# Patient Record
Sex: FEMALE | Race: WHITE | Employment: FULL TIME | URBAN - METROPOLITAN AREA
[De-identification: names, ages, dates, MRNs, and addresses within clinical notes are randomized per-mention and may not be internally consistent; named-entity substitution may affect disease eponyms.]

---

## 2017-05-01 LAB
HIGH RISK HPV CERVIX: NORMAL
PAP SMEAR: NORMAL

## 2018-07-19 ENCOUNTER — OFFICE VISIT (OUTPATIENT)
Dept: INTERNAL MEDICINE CLINIC | Age: 48
End: 2018-07-19

## 2018-07-19 VITALS
DIASTOLIC BLOOD PRESSURE: 80 MMHG | HEIGHT: 63 IN | HEART RATE: 64 BPM | BODY MASS INDEX: 23.21 KG/M2 | WEIGHT: 131 LBS | SYSTOLIC BLOOD PRESSURE: 118 MMHG

## 2018-07-19 DIAGNOSIS — Z00.00 ANNUAL PHYSICAL EXAM: Primary | ICD-10-CM

## 2018-07-19 DIAGNOSIS — M81.0 OSTEOPOROSIS WITHOUT CURRENT PATHOLOGICAL FRACTURE, UNSPECIFIED OSTEOPOROSIS TYPE: ICD-10-CM

## 2018-07-19 DIAGNOSIS — E89.40 PREMATURE SURGICAL MENOPAUSE: ICD-10-CM

## 2018-07-19 DIAGNOSIS — R79.89 HIGH SERUM CREATINE: ICD-10-CM

## 2018-07-19 LAB
A/G RATIO: 1.8 (ref 1.1–2.2)
ALBUMIN SERPL-MCNC: 4.7 G/DL (ref 3.4–5)
ALP BLD-CCNC: 55 U/L (ref 40–129)
ALT SERPL-CCNC: 22 U/L (ref 10–40)
ANION GAP SERPL CALCULATED.3IONS-SCNC: 12 MMOL/L (ref 3–16)
AST SERPL-CCNC: 24 U/L (ref 15–37)
BASOPHILS ABSOLUTE: 0 K/UL (ref 0–0.2)
BASOPHILS RELATIVE PERCENT: 0.6 %
BILIRUB SERPL-MCNC: 0.5 MG/DL (ref 0–1)
BUN BLDV-MCNC: 23 MG/DL (ref 7–20)
CALCIUM SERPL-MCNC: 10 MG/DL (ref 8.3–10.6)
CHLORIDE BLD-SCNC: 103 MMOL/L (ref 99–110)
CHOLESTEROL, TOTAL: 195 MG/DL (ref 0–199)
CO2: 23 MMOL/L (ref 21–32)
CREAT SERPL-MCNC: 1.7 MG/DL (ref 0.6–1.1)
EOSINOPHILS ABSOLUTE: 0 K/UL (ref 0–0.6)
EOSINOPHILS RELATIVE PERCENT: 0.8 %
GFR AFRICAN AMERICAN: 39
GFR NON-AFRICAN AMERICAN: 32
GLOBULIN: 2.6 G/DL
GLUCOSE BLD-MCNC: 81 MG/DL (ref 70–99)
HCT VFR BLD CALC: 40.5 % (ref 36–48)
HDLC SERPL-MCNC: 60 MG/DL (ref 40–60)
HEMOGLOBIN: 13.4 G/DL (ref 12–16)
LDL CHOLESTEROL CALCULATED: 120 MG/DL
LYMPHOCYTES ABSOLUTE: 1.1 K/UL (ref 1–5.1)
LYMPHOCYTES RELATIVE PERCENT: 22.2 %
MCH RBC QN AUTO: 31.2 PG (ref 26–34)
MCHC RBC AUTO-ENTMCNC: 33.1 G/DL (ref 31–36)
MCV RBC AUTO: 94.2 FL (ref 80–100)
MONOCYTES ABSOLUTE: 0.3 K/UL (ref 0–1.3)
MONOCYTES RELATIVE PERCENT: 6 %
NEUTROPHILS ABSOLUTE: 3.6 K/UL (ref 1.7–7.7)
NEUTROPHILS RELATIVE PERCENT: 70.4 %
PDW BLD-RTO: 13.5 % (ref 12.4–15.4)
PLATELET # BLD: 191 K/UL (ref 135–450)
PMV BLD AUTO: 10.6 FL (ref 5–10.5)
POTASSIUM SERPL-SCNC: 4.6 MMOL/L (ref 3.5–5.1)
RBC # BLD: 4.29 M/UL (ref 4–5.2)
SODIUM BLD-SCNC: 138 MMOL/L (ref 136–145)
TOTAL PROTEIN: 7.3 G/DL (ref 6.4–8.2)
TRIGL SERPL-MCNC: 76 MG/DL (ref 0–150)
TSH REFLEX: 2.03 UIU/ML (ref 0.27–4.2)
VITAMIN D 25-HYDROXY: 63.3 NG/ML
VLDLC SERPL CALC-MCNC: 15 MG/DL
WBC # BLD: 5.1 K/UL (ref 4–11)

## 2018-07-19 PROCEDURE — 99396 PREV VISIT EST AGE 40-64: CPT | Performed by: FAMILY MEDICINE

## 2018-07-19 ASSESSMENT — PATIENT HEALTH QUESTIONNAIRE - PHQ9
1. LITTLE INTEREST OR PLEASURE IN DOING THINGS: 0
SUM OF ALL RESPONSES TO PHQ9 QUESTIONS 1 & 2: 0
SUM OF ALL RESPONSES TO PHQ QUESTIONS 1-9: 0
2. FEELING DOWN, DEPRESSED OR HOPELESS: 0

## 2018-07-19 NOTE — PROGRESS NOTES
Chief Complaint   Patient presents with    Annual Exam     Fasting today       PREVENTATIVE VISIT AND EXAM      Exercise:  30 minutes six days per week. Patient Active Problem List   Diagnosis    Osteoporosis    Premature surgical menopause    High serum creatine       Past Medical History:   Diagnosis Date    Anemia     Blood transfusion     Endometriosis     Osteoporosis     forearm density -3;  all else osteopenia    Recurrent UTI (urinary tract infection)      Past Surgical History:   Procedure Laterality Date    APPENDECTOMY  9/00    COLECTOMY  9/00    small section    SALPINGO-OOPHORECTOMY  7/01, 47/54    BSO- complicated with pneumonia/sepsis    URETER STENT PLACEMENT  4/03    then removed in 10/03     Family History   Problem Relation Age of Onset    Arthritis Father         wheelchair primarily and inactive    High Cholesterol Father     Dementia Father         milder stages born 5    Diabetes Mother     Hypertension Mother     Mental Illness Sister         Bipolar, ADHD   [employer] Sister     Asthma Sister     Other Other         adopted son;  not biologic, born 2007    Other Brother         GI issues, not defined     Social History   Substance Use Topics    Smoking status: Never Smoker    Smokeless tobacco: Never Used    Alcohol use No       Outpatient Prescriptions Marked as Taking for the 7/19/18 encounter (Office Visit) with Luda Kay MD   Medication Sig Dispense Refill    calcium carbonate (OSCAL) 500 MG TABS tablet Take 500 mg by mouth daily.  Estradiol-Norethindrone Acet (ACTIVELLA PO) Take  by mouth daily. ROS:  Full 12 system review done and all negative except for the following:   Frequent migraines  Color changes to fingers and toes periodically. Turn white. Physical Exam   Vitals:    07/19/18 0931   BP: 118/80   Pulse: 64     Body mass index is 23.21 kg/m².   Wt Readings from Last 3 Encounters:   07/19/18 131 lb (59.4 kg)

## 2018-07-21 DIAGNOSIS — R79.89 HIGH SERUM CREATINE: Primary | ICD-10-CM

## 2018-10-30 ENCOUNTER — TELEPHONE (OUTPATIENT)
Dept: INTERNAL MEDICINE CLINIC | Age: 48
End: 2018-10-30

## 2018-10-30 NOTE — TELEPHONE ENCOUNTER
Pt call and states that she fax the paperwork for Dr Kat Webb to sign and to put the office stamp. She states she call to request Dr Kat Webb to sign , she had to have that by tomorrow. She request to fax her back to the fax number she provide in that paperwork. She request call back if you have any question.

## 2019-01-29 PROBLEM — N17.9 AKI (ACUTE KIDNEY INJURY) (HCC): Status: ACTIVE | Noted: 2019-01-29

## 2019-01-29 PROBLEM — E87.6 HYPOKALEMIA: Status: ACTIVE | Noted: 2019-01-29

## 2019-03-11 PROBLEM — Z87.42 HISTORY OF ENDOMETRIOSIS: Status: ACTIVE | Noted: 2018-09-24

## 2019-03-11 PROBLEM — D64.9 ANEMIA: Status: ACTIVE | Noted: 2018-09-24

## 2019-03-11 PROBLEM — N80.9 ENDOMETRIOSIS: Status: ACTIVE | Noted: 2019-03-11

## 2019-05-08 PROBLEM — R80.0 ISOLATED PROTEINURIA: Status: ACTIVE | Noted: 2019-05-08

## 2019-05-08 PROBLEM — I10 ESSENTIAL HYPERTENSION: Status: ACTIVE | Noted: 2019-05-08

## 2019-09-03 ENCOUNTER — TELEPHONE (OUTPATIENT)
Dept: INTERNAL MEDICINE CLINIC | Age: 49
End: 2019-09-03

## 2019-09-03 DIAGNOSIS — Z78.0 POST-MENOPAUSE: Primary | ICD-10-CM

## 2019-10-09 ENCOUNTER — OFFICE VISIT (OUTPATIENT)
Dept: INTERNAL MEDICINE CLINIC | Age: 49
End: 2019-10-09
Payer: COMMERCIAL

## 2019-10-09 VITALS
DIASTOLIC BLOOD PRESSURE: 76 MMHG | SYSTOLIC BLOOD PRESSURE: 110 MMHG | WEIGHT: 135 LBS | HEART RATE: 64 BPM | HEIGHT: 63 IN | BODY MASS INDEX: 23.92 KG/M2

## 2019-10-09 DIAGNOSIS — J30.0 VASOMOTOR RHINITIS: ICD-10-CM

## 2019-10-09 DIAGNOSIS — Z00.00 ANNUAL PHYSICAL EXAM: Primary | ICD-10-CM

## 2019-10-09 DIAGNOSIS — Z23 NEED FOR INFLUENZA VACCINATION: ICD-10-CM

## 2019-10-09 DIAGNOSIS — E89.40 PREMATURE SURGICAL MENOPAUSE: ICD-10-CM

## 2019-10-09 DIAGNOSIS — M85.89 OSTEOPENIA OF MULTIPLE SITES: ICD-10-CM

## 2019-10-09 PROBLEM — E87.6 HYPOKALEMIA: Status: RESOLVED | Noted: 2019-01-29 | Resolved: 2019-10-09

## 2019-10-09 LAB
A/G RATIO: 3 (ref 1.1–2.2)
ALBUMIN SERPL-MCNC: 5.1 G/DL (ref 3.4–5)
ALP BLD-CCNC: 37 U/L (ref 40–129)
ALT SERPL-CCNC: 13 U/L (ref 10–40)
ANION GAP SERPL CALCULATED.3IONS-SCNC: 12 MMOL/L (ref 3–16)
AST SERPL-CCNC: 18 U/L (ref 15–37)
BILIRUB SERPL-MCNC: 0.5 MG/DL (ref 0–1)
BUN BLDV-MCNC: 16 MG/DL (ref 7–20)
CALCIUM SERPL-MCNC: 9.8 MG/DL (ref 8.3–10.6)
CHLORIDE BLD-SCNC: 102 MMOL/L (ref 99–110)
CHOLESTEROL, TOTAL: 161 MG/DL (ref 0–199)
CO2: 23 MMOL/L (ref 21–32)
CREAT SERPL-MCNC: 1.4 MG/DL (ref 0.6–1.1)
GFR AFRICAN AMERICAN: 49
GFR NON-AFRICAN AMERICAN: 40
GLOBULIN: 1.7 G/DL
GLUCOSE BLD-MCNC: 81 MG/DL (ref 70–99)
HDLC SERPL-MCNC: 50 MG/DL (ref 40–60)
LDL CHOLESTEROL CALCULATED: 96 MG/DL
POTASSIUM SERPL-SCNC: 4.8 MMOL/L (ref 3.5–5.1)
SODIUM BLD-SCNC: 137 MMOL/L (ref 136–145)
TOTAL PROTEIN: 6.8 G/DL (ref 6.4–8.2)
TRIGL SERPL-MCNC: 73 MG/DL (ref 0–150)
TSH REFLEX: 2.08 UIU/ML (ref 0.27–4.2)
VLDLC SERPL CALC-MCNC: 15 MG/DL

## 2019-10-09 PROCEDURE — 90471 IMMUNIZATION ADMIN: CPT | Performed by: FAMILY MEDICINE

## 2019-10-09 PROCEDURE — 99396 PREV VISIT EST AGE 40-64: CPT | Performed by: FAMILY MEDICINE

## 2019-10-09 PROCEDURE — 90686 IIV4 VACC NO PRSV 0.5 ML IM: CPT | Performed by: FAMILY MEDICINE

## 2019-10-09 RX ORDER — AZELASTINE 1 MG/ML
SPRAY, METERED NASAL
Qty: 1 BOTTLE | Refills: 12 | Status: SHIPPED | OUTPATIENT
Start: 2019-10-09

## 2019-10-09 RX ORDER — ESTRADIOL 2 MG/1
1 TABLET ORAL DAILY
COMMUNITY
Start: 2018-04-24

## 2019-10-09 SDOH — ECONOMIC STABILITY: TRANSPORTATION INSECURITY
IN THE PAST 12 MONTHS, HAS LACK OF TRANSPORTATION KEPT YOU FROM MEETINGS, WORK, OR FROM GETTING THINGS NEEDED FOR DAILY LIVING?: NO

## 2019-10-09 SDOH — ECONOMIC STABILITY: TRANSPORTATION INSECURITY
IN THE PAST 12 MONTHS, HAS THE LACK OF TRANSPORTATION KEPT YOU FROM MEDICAL APPOINTMENTS OR FROM GETTING MEDICATIONS?: NO

## 2019-10-09 ASSESSMENT — PATIENT HEALTH QUESTIONNAIRE - PHQ9
1. LITTLE INTEREST OR PLEASURE IN DOING THINGS: 0
SUM OF ALL RESPONSES TO PHQ9 QUESTIONS 1 & 2: 0
SUM OF ALL RESPONSES TO PHQ QUESTIONS 1-9: 0
2. FEELING DOWN, DEPRESSED OR HOPELESS: 0
SUM OF ALL RESPONSES TO PHQ QUESTIONS 1-9: 0

## 2019-10-25 ENCOUNTER — TELEPHONE (OUTPATIENT)
Dept: INTERNAL MEDICINE CLINIC | Age: 49
End: 2019-10-25

## 2020-07-31 LAB
HIGH RISK HPV CERVIX: NEGATIVE
PAP SMEAR, EXTERNAL: NORMAL

## 2020-10-19 ENCOUNTER — OFFICE VISIT (OUTPATIENT)
Dept: INTERNAL MEDICINE CLINIC | Age: 50
End: 2020-10-19
Payer: COMMERCIAL

## 2020-10-19 VITALS
SYSTOLIC BLOOD PRESSURE: 130 MMHG | HEART RATE: 56 BPM | BODY MASS INDEX: 25.69 KG/M2 | DIASTOLIC BLOOD PRESSURE: 64 MMHG | WEIGHT: 145 LBS | HEIGHT: 63 IN

## 2020-10-19 PROBLEM — F95.0 TIC DISORDER, TRANSIENT, RECURRENT: Status: ACTIVE | Noted: 2020-10-19

## 2020-10-19 PROCEDURE — 90472 IMMUNIZATION ADMIN EACH ADD: CPT | Performed by: FAMILY MEDICINE

## 2020-10-19 PROCEDURE — 99396 PREV VISIT EST AGE 40-64: CPT | Performed by: FAMILY MEDICINE

## 2020-10-19 PROCEDURE — 90715 TDAP VACCINE 7 YRS/> IM: CPT | Performed by: FAMILY MEDICINE

## 2020-10-19 PROCEDURE — 90686 IIV4 VACC NO PRSV 0.5 ML IM: CPT | Performed by: FAMILY MEDICINE

## 2020-10-19 PROCEDURE — 90471 IMMUNIZATION ADMIN: CPT | Performed by: FAMILY MEDICINE

## 2020-10-19 SDOH — ECONOMIC STABILITY: FOOD INSECURITY: WITHIN THE PAST 12 MONTHS, THE FOOD YOU BOUGHT JUST DIDN'T LAST AND YOU DIDN'T HAVE MONEY TO GET MORE.: NEVER TRUE

## 2020-10-19 SDOH — ECONOMIC STABILITY: INCOME INSECURITY: HOW HARD IS IT FOR YOU TO PAY FOR THE VERY BASICS LIKE FOOD, HOUSING, MEDICAL CARE, AND HEATING?: NOT HARD AT ALL

## 2020-10-19 SDOH — ECONOMIC STABILITY: FOOD INSECURITY: WITHIN THE PAST 12 MONTHS, YOU WORRIED THAT YOUR FOOD WOULD RUN OUT BEFORE YOU GOT MONEY TO BUY MORE.: NEVER TRUE

## 2020-10-19 ASSESSMENT — PATIENT HEALTH QUESTIONNAIRE - PHQ9
SUM OF ALL RESPONSES TO PHQ QUESTIONS 1-9: 0
SUM OF ALL RESPONSES TO PHQ QUESTIONS 1-9: 0
2. FEELING DOWN, DEPRESSED OR HOPELESS: 0
SUM OF ALL RESPONSES TO PHQ QUESTIONS 1-9: 0
SUM OF ALL RESPONSES TO PHQ9 QUESTIONS 1 & 2: 0
1. LITTLE INTEREST OR PLEASURE IN DOING THINGS: 0

## 2020-10-19 NOTE — PATIENT INSTRUCTIONS
Look up sites that end with .gov or .org  These would be reliable in the information given. Be suspicious of dot com sites.

## 2020-10-19 NOTE — PROGRESS NOTES
Chief Complaint   Patient presents with    Annual Exam       PREVENTATIVE VISIT AND EXAM      Working from home. Snacking more and not as active during the work day but she does exercise regularly. Feeling fine. Will see nephrologist again in Dec.  She is concerned about her risk of Lewy Body dementia. Her father  in his early 76s from this. She reports that he retired early and then just stat and watched TV after he retired and did not stay active or busy. Exercise:  45 min 6=7 days a week     Patient Active Problem List   Diagnosis    Osteopenia of multiple sites    Premature surgical menopause    High serum creatine - creat clearance on 24 hr urine much higher than calculated.  FORTINO (acute kidney injury) ([facility] [address]    Anemia    Dermatophytosis    Disorder of kidney and ureter    Endometriosis    Hematuria    History of endometriosis    Isolated proteinuria    Essential hypertension    Vasomotor rhinitis       Past Medical History:   Diagnosis Date    Anemia     Blood transfusion     Chronic kidney disease     Endometriosis     Hypertension     Osteoporosis     forearm density -3;  all else osteopenia    Recurrent UTI (urinary tract infection)      Past Surgical History:   Procedure Laterality Date    APPENDECTOMY      COLECTOMY      small section    SALPINGO-OOPHORECTOMY  ,     BSO- complicated with pneumonia/sepsis    URETER STENT PLACEMENT      then removed in 10/03     Family History   Problem Relation Age of Onset    Arthritis Father         wheelchair primarily and inactive    High Cholesterol Father     Dementia Father         Lewy Body.    age 68    Diabetes Mother     Hypertension Mother     Mental Illness Sister         Bipolar, ADHD   [employer] Migraines Sister     Asthma Sister    Vincent Loser Other Other         adopted son, born ; Asperger's mild    Other Brother         GI issues, possibly Crohns     Social History     Tobacco Use    Smoking status: Never Smoker    Smokeless tobacco: Never Used   Substance Use Topics    Alcohol use: No    Drug use: No       Outpatient Medications Marked as Taking for the 10/19/20 encounter (Office Visit) with Milan Cobb MD   Medication Sig Dispense Refill    norethindrone (AYGESTIN) 5 MG tablet Take 1/2 of one tablet daily po      estradiol (ESTRACE) 2 MG tablet Take 1 mg by mouth daily      azelastine (ASTELIN) 0.1 % nasal spray 1-2 spray in each nostril twice daily as needed. 1 Bottle 12    calcium carbonate (OSCAL) 500 MG TABS tablet Take 500 mg by mouth daily           ROS:  Full 12 system review done and all negative except for the following: All negative except dose not a head tic when she is a little anxious or stressed. Her brother has this also. It is not always present. Physical Exam   Vitals:    10/19/20 1448   BP: 130/64   Pulse: 56      Body mass index is 25.69 kg/m². Wt Readings from Last 3 Encounters:   10/19/20 145 lb (65.8 kg)   06/23/20 142 lb (64.4 kg)   11/13/19 139 lb 3.2 oz (63.1 kg)         Constitutional: Oriented to person, place, and time. Appears well-developed and well-nourished. No distress. HENT:   Head: Normocephalic and atraumatic. Ears: Hearing, tympanic membrane, external ear and ear canal normal.   Nose: Nose normal.   Mouth/Throat: Uvula is midline, oropharynx is clear and moist and mucous membranes are normal.   Eyes: Conjunctivae and EOM are normal. Pupils are equal, round, and reactive to light. No scleral icterus. Neck: Normal range of motion. Neck supple. Normal carotid pulses and no JVD present. Carotid bruit is not present. No tracheal deviation present. No mass and no thyromegaly present. Cardiovascular: Normal rate, regular rhythm, S1 normal, S2 normal, normal heart sounds and intact distal pulses. No murmur heard. Pulmonary/Chest: Effort normal and breath sounds normal. No stridor. No respiratory distress. No decreased breath sounds.  No wheezes. No rhonchi. No rales. Abdominal: Soft. Normal appearance and bowel sounds are normal. No distension, no abdominal bruit and no mass. There is no hepatomegaly. No tenderness. Musculoskeletal: Normal range of motion. No edema. Lymphadenopathy:     No cervical adenopathy. No axillary adenopathy. No supraclavicular adenopathy. No Inguinal adenopathy. Neurological: Alert and oriented to person, place, and time. Normal reflexes. No tremor, but has a tic of her head/neck with a jerking. No cranial nerve deficit. Normal muscle tone. Coordination and gait normal.   Skin: Skin is warm and dry. No rash noted. Not diaphoretic. No cyanosis. No pallor. Nails show no clubbing. Psychiatric:  Normal mood and affect. Speech is normal and behavior is normal. Cognition and memory are normal.       The 10-year ASCVD risk score (Nati Mcnulty et al., 2013) is: 0.9%    Values used to calculate the score:      Age: 52 years      Sex: Female      Is Non- : No      Diabetic: No      Tobacco smoker: No      Systolic Blood Pressure: 740 mmHg      Is BP treated: No      HDL Cholesterol: 50 mg/dL      Total Cholesterol: 161 mg/dL      Assessment:      1. Annual physical exam  Update HM issues. - CBC; Future  - Lipid Panel; Future  - TSH with Reflex; Future  - Renal Function Panel; Future  - PTH, Intact; Future    2. Need for Tdap vaccination  Discussed. - Tdap (age 6y and older) IM ([address])    3. Disorder of kidney and ureter  Monitor renal function. Has proteinuria and probably has stage 2 CKD. - Cystatin C; Future  - PTH, Intact; Future    4. Persistent proteinuria  Monitor. She will get her urine with nephrologist.   - Cystatin C; Future  - PTH, Intact; Future    5. Tic disorder, transient, recurrent  Will watch. 6. Need for influenza vaccination  - INFLUENZA, QUADV, 3 YRS AND OLDER, IM PF, PREFILL SYR OR SDV, 0.5ML (AFLURIA QUADV, PF)    7. Family history for Lewy Body. - reviewed up to date and there is genetic testing but not felt to be helpful for predicting if this will develop. Discussed healthy diet, physical activity and social connections, brain stimulation. Health Maintenance Due   Topic Date Due    Diabetes screen  03/21/2012    DTaP/Tdap/Td vaccine (2 - Tdap) 09/01/2020    Flu vaccine (1) 09/01/2020         Plan:    See orders and patient instructions. Age-appropriate preventative issues discussed and hand out given. An electronic signature was used to authenticate this note.     --Skylar Busch MD on 10/19/2020

## 2020-10-20 LAB
ALBUMIN SERPL-MCNC: 4.2 G/DL (ref 3.5–5.7)
ANION GAP SERPL CALCULATED.3IONS-SCNC: 7 MMOL/L (ref 6–18)
BUN BLDV-MCNC: 19 MG/DL (ref 8–26)
CALCIUM SERPL-MCNC: 8.9 MG/DL (ref 8.5–10.4)
CHLORIDE BLD-SCNC: 107 MEQ/L (ref 98–111)
CHOLESTEROL, TOTAL: 172 MG/DL
CO2: 24 MMOL/L (ref 21–31)
CREAT SERPL-MCNC: 1.46 MG/DL (ref 0.6–1.2)
GFR AFRICAN AMERICAN: 48 ML/MIN/1.73 M2
GFR NON-AFRICAN AMERICAN: 42 ML/MIN/1.73 M2
GLUCOSE BLD-MCNC: 90 MG/DL (ref 70–99)
HCT VFR BLD CALC: 38.1 % (ref 36–46)
HDLC SERPL-MCNC: 49 MG/DL
HEMOGLOBIN: 13.3 G/DL (ref 12–15.2)
LDL CHOLESTEROL CALCULATED: 104 MG/DL
MCH RBC QN AUTO: 33.3 PG (ref 27–33)
MCHC RBC AUTO-ENTMCNC: 34.9 G/DL (ref 32–36)
MCV RBC AUTO: 95.6 FL (ref 82–97)
NONHDLC SERPL-MCNC: 123 MG/DL
PARATHYROID HORMONE INTACT: 76.5 PG/ML (ref 15–65)
PDW BLD-RTO: 12.8 % (ref 12.3–17)
PHOSPHORUS: 2.2 MG/DL (ref 2.5–4.5)
PLATELET # BLD: 186 THOU/MCL (ref 140–375)
PMV BLD AUTO: 9.2 FL (ref 7.4–11.5)
POTASSIUM SERPL-SCNC: 4.5 MEQ/L (ref 3.6–5.1)
RBC # BLD: 3.99 MIL/MCL (ref 3.8–5.2)
SODIUM BLD-SCNC: 138 MEQ/L (ref 135–145)
TRIGL SERPL-MCNC: 96 MG/DL
TSH ULTRASENSITIVE: 2.99 MCIU/ML (ref 0.27–4.2)
WBC # BLD: 5.7 THOU/MCL (ref 3.6–10.5)

## 2020-12-16 PROBLEM — N18.31 CHRONIC KIDNEY DISEASE, STAGE 3A (HCC): Status: ACTIVE | Noted: 2020-12-16

## 2021-04-26 ENCOUNTER — OFFICE VISIT (OUTPATIENT)
Dept: INTERNAL MEDICINE CLINIC | Age: 51
End: 2021-04-26
Payer: COMMERCIAL

## 2021-04-26 VITALS
HEART RATE: 63 BPM | WEIGHT: 141.8 LBS | DIASTOLIC BLOOD PRESSURE: 66 MMHG | BODY MASS INDEX: 25.12 KG/M2 | HEIGHT: 63 IN | SYSTOLIC BLOOD PRESSURE: 104 MMHG

## 2021-04-26 DIAGNOSIS — Z48.00 ATTENTION TO DRESSINGS AND SUTURES: ICD-10-CM

## 2021-04-26 DIAGNOSIS — Z48.02 ATTENTION TO DRESSINGS AND SUTURES: ICD-10-CM

## 2021-04-26 DIAGNOSIS — Z63.79 STRESS DUE TO ILLNESS OF FAMILY MEMBER: ICD-10-CM

## 2021-04-26 DIAGNOSIS — S61.412D LACERATION OF LEFT HAND WITHOUT FOREIGN BODY, SUBSEQUENT ENCOUNTER: Primary | ICD-10-CM

## 2021-04-26 PROBLEM — N17.9 AKI (ACUTE KIDNEY INJURY) (HCC): Status: RESOLVED | Noted: 2019-01-29 | Resolved: 2021-04-26

## 2021-04-26 PROBLEM — S61.412A LACERATION OF LEFT HAND WITHOUT FOREIGN BODY: Status: ACTIVE | Noted: 2021-04-26

## 2021-04-26 PROCEDURE — 99212 OFFICE O/P EST SF 10 MIN: CPT | Performed by: FAMILY MEDICINE

## 2021-04-26 ASSESSMENT — PATIENT HEALTH QUESTIONNAIRE - PHQ9
SUM OF ALL RESPONSES TO PHQ QUESTIONS 1-9: 0
1. LITTLE INTEREST OR PLEASURE IN DOING THINGS: 0
SUM OF ALL RESPONSES TO PHQ9 QUESTIONS 1 & 2: 0

## 2021-04-26 NOTE — PROGRESS NOTES
of the left index finger. Extension is normal.  Strength of the finger overall appears to be normal   Skin:     Coloration: Skin is not pale. Neurological:      General: No focal deficit present. Mental Status: She is alert and oriented to person, place, and time. Psychiatric:         Mood and Affect: Mood normal.         Behavior: Behavior normal.        Suture removal by physician. 8 sutures were removed from the palm. They were quite uncomfortable for her. Her tic was evident while the sutures were being removed. There is a little pool pulling apart at the medial aspect of the laceration but it is overall intact. The lateral aspect is completely intact and healed. There is no bleeding associated with the laceration or suture removal.  Polysporin ointment and a dry sterile dressing were applied. Assessment:      1. Laceration of left hand without foreign body, subsequent encounter  Suggest that she see hand specialist if the numbness and tingling does not resolve in a month. Also suggest she see them sooner if she is unable to fully flex the finger in another week after the skin is fully healed. She can try to flex and extend the finger under water. 2. Attention to dressings and sutures  8 sutures were removed. 3. Stress due to illness of family member  General support given. She did not think she needed any other treatment. Plan:      See orders. See after visit summary, patient instructions, and reference hand-outs. A PRINTED SUMMARY = AVS GIVEN TO THE PATIENT, (or if Virtual Visit, patient told it is available in My Chart or will be mailed if not active on My Chart.)    Discussed use, benefit, and side effects of prescribed medications. Barriers to medication compliance addressed. All patient questions answered.       Follow up:  ana rosa Marsh MD

## 2021-04-26 NOTE — PATIENT INSTRUCTIONS
New skin to paint over if the bandaids are not working and you need to keep it protected from water or hand . An Advanced Healing Bandaid is also an option. Hydrocolloid gel bandage.

## 2021-09-29 ENCOUNTER — OFFICE VISIT (OUTPATIENT)
Dept: INTERNAL MEDICINE CLINIC | Age: 51
End: 2021-09-29
Payer: COMMERCIAL

## 2021-09-29 VITALS
DIASTOLIC BLOOD PRESSURE: 70 MMHG | WEIGHT: 147 LBS | SYSTOLIC BLOOD PRESSURE: 102 MMHG | BODY MASS INDEX: 26.05 KG/M2 | HEIGHT: 63 IN | HEART RATE: 64 BPM

## 2021-09-29 DIAGNOSIS — Z23 NEED FOR INFLUENZA VACCINATION: ICD-10-CM

## 2021-09-29 DIAGNOSIS — Z00.00 ANNUAL PHYSICAL EXAM: Primary | ICD-10-CM

## 2021-09-29 DIAGNOSIS — I10 ESSENTIAL HYPERTENSION: ICD-10-CM

## 2021-09-29 DIAGNOSIS — F41.1 GAD (GENERALIZED ANXIETY DISORDER): ICD-10-CM

## 2021-09-29 PROCEDURE — 90471 IMMUNIZATION ADMIN: CPT | Performed by: FAMILY MEDICINE

## 2021-09-29 PROCEDURE — 90674 CCIIV4 VAC NO PRSV 0.5 ML IM: CPT | Performed by: FAMILY MEDICINE

## 2021-09-29 PROCEDURE — 99396 PREV VISIT EST AGE 40-64: CPT | Performed by: FAMILY MEDICINE

## 2021-09-29 RX ORDER — FLUOXETINE HYDROCHLORIDE 20 MG/1
20 CAPSULE ORAL DAILY
Qty: 30 CAPSULE | Refills: 1 | Status: SHIPPED | OUTPATIENT
Start: 2021-09-29 | End: 2021-11-29

## 2021-09-29 SDOH — HEALTH STABILITY: PHYSICAL HEALTH: ON AVERAGE, HOW MANY DAYS PER WEEK DO YOU ENGAGE IN MODERATE TO STRENUOUS EXERCISE (LIKE A BRISK WALK)?: 6 DAYS

## 2021-09-29 SDOH — HEALTH STABILITY: PHYSICAL HEALTH: ON AVERAGE, HOW MANY MINUTES DO YOU ENGAGE IN EXERCISE AT THIS LEVEL?: 50 MIN

## 2021-09-29 ASSESSMENT — PATIENT HEALTH QUESTIONNAIRE - PHQ9
9. THOUGHTS THAT YOU WOULD BE BETTER OFF DEAD, OR OF HURTING YOURSELF: 0
8. MOVING OR SPEAKING SO SLOWLY THAT OTHER PEOPLE COULD HAVE NOTICED. OR THE OPPOSITE, BEING SO FIGETY OR RESTLESS THAT YOU HAVE BEEN MOVING AROUND A LOT MORE THAN USUAL: 2
SUM OF ALL RESPONSES TO PHQ QUESTIONS 1-9: 12
3. TROUBLE FALLING OR STAYING ASLEEP: 0
5. POOR APPETITE OR OVEREATING: 3
7. TROUBLE CONCENTRATING ON THINGS, SUCH AS READING THE NEWSPAPER OR WATCHING TELEVISION: 2
6. FEELING BAD ABOUT YOURSELF - OR THAT YOU ARE A FAILURE OR HAVE LET YOURSELF OR YOUR FAMILY DOWN: 0
SUM OF ALL RESPONSES TO PHQ9 QUESTIONS 1 & 2: 2
10. IF YOU CHECKED OFF ANY PROBLEMS, HOW DIFFICULT HAVE THESE PROBLEMS MADE IT FOR YOU TO DO YOUR WORK, TAKE CARE OF THINGS AT HOME, OR GET ALONG WITH OTHER PEOPLE: 0
2. FEELING DOWN, DEPRESSED OR HOPELESS: 0
4. FEELING TIRED OR HAVING LITTLE ENERGY: 3
SUM OF ALL RESPONSES TO PHQ QUESTIONS 1-9: 12
SUM OF ALL RESPONSES TO PHQ QUESTIONS 1-9: 12
1. LITTLE INTEREST OR PLEASURE IN DOING THINGS: 2

## 2021-09-29 NOTE — LETTER
9/29/2021       Annalise Michaels  [phone]        Dear Annalise Michaels: At the request of your physician, please answer the following questions, circling one number response (0, 1, 2, or 3) for each question. Over the last two weeks, how often have you been bothered by any of the following problems:        Problem Not  At  All Several  Days More  Than  Half  The  Days Nearly  Every  Day   1. Little interest or pleasure in doing things? 0 1 2 3   2. Feeling down, depressed, or hopeless? 0 1 2 3   3. Trouble falling or staying asleep or sleeping too much? 0 1 2 3   4. Feeling tired or having low energy? 0 1 2 3   5. Poor appetite or overeating? 0 1 2 3   6. Feeling bad about yourself or that you are a failure, or having let yourself or your family down? 0 1 2 3   7. Trouble concentrating on things, such as reading the newspaper or watching television? 0 1 2 3   8. Moving or speaking so slowly that other people could have noticed? Or the opposite - being so fidgety or restless that you have been moving around a lot more than usual?   0   1   2   3   9. Thoughts that you would be better off dead or hurting yourself in some way? 0 1 2 3     If you marked any of the problems as a 1, 2, or 3, how difficult have these problems made it for you to do your work, take care of things at home, or get along with other people?     Not difficult at all         Somewhat difficult        Very difficult        Extremely difficult

## 2021-09-29 NOTE — PROGRESS NOTES
Chief Complaint   Patient presents with    Annual Exam       PREVENTATIVE VISIT AND EXAM      Feeling anxious:  See below. Also unhappy with her weight gain. Some weight gain and fatigue. Working from home and thinks this contributes. Will work from home at least until the end of this year. Sleeps really well.  works nights so not sure if she snores. Feels anxious a lot and it pre-dates the pandemic. A lot of things with her son. Her son is mentally ill and is at her home. He is 15years old. He got started on fluoxetine and is doing better. Exercise:  Weights 3 days a week and cardio 6-7 days a week. Patient Active Problem List   Diagnosis    Osteopenia of multiple sites    Premature surgical menopause    High serum creatine - creat clearance on 24 hr urine much higher than calculated.     Anemia    Dermatophytosis    Disorder of kidney and ureter    Endometriosis    Hematuria    History of endometriosis    Isolated proteinuria    Essential hypertension    Vasomotor rhinitis    Tic disorder, transient, recurrent    Chronic kidney disease, stage 3a ([address]    Stress due to illness of family member    Laceration of left hand without foreign body       Past Medical History:   Diagnosis Date    Anemia     Blood transfusion     Chronic kidney disease     est GFR underestimates true function;  stage 2 with est GFR of 75 with creatinine of 1.4    Endometriosis     Hypertension     Osteoporosis     forearm density -3;  all else osteopenia    Recurrent UTI (urinary tract infection)     Tic disorder, transient, recurrent 10/19/2020     Past Surgical History:   Procedure Laterality Date    APPENDECTOMY  9/00    COLECTOMY  9/00    small section    SALPINGO-OOPHORECTOMY  7/01, 99/90    BSO- complicated with pneumonia/sepsis    URETER STENT PLACEMENT  4/03    then removed in 10/03     Family History   Problem Relation Age of Onset    Arthritis Father         wheelchair equal, round, and reactive to light. No scleral icterus. Neck: Normal range of motion. Neck supple. Normal carotid pulses and no JVD present. Carotid bruit is not present. No tracheal deviation present. No mass and no thyromegaly present. Cardiovascular: Normal rate, regular rhythm, S1 normal, S2 normal, normal heart sounds and intact distal pulses. No murmur heard. Pulmonary/Chest: Effort normal and breath sounds normal. No stridor. No respiratory distress. No decreased breath sounds. No wheezes. No rhonchi. No rales. Abdominal: Soft. Normal appearance and bowel sounds are normal. No distension, no abdominal bruit and no mass. There is no hepatomegaly. No tenderness. Musculoskeletal: Normal range of motion. No edema. Lymphadenopathy:     No cervical adenopathy. No axillary adenopathy. No supraclavicular adenopathy. No Inguinal adenopathy. Neurological: Alert and oriented to person, place, and time. Normal reflexes. No tremor. No cranial nerve deficit. She exhibits normal muscle tone. Coordination and gait normal.   Skin: Skin is warm and dry. No rash noted. Not diaphoretic. No cyanosis. No pallor. Nails show no clubbing. Psychiatric:  Normal mood and affect.  Speech is normal and behavior is normal. Cognition and memory are normal.       The 10-year ASCVD risk score (Libra Tejada., et al., 2013) is: 0.9%    Values used to calculate the score:      Age: 54 years      Sex: Female      Is Non- : No      Diabetic: No      Tobacco smoker: No      Systolic Blood Pressure: 727 mmHg      Is BP treated: Yes      HDL Cholesterol: 49 mg/dL      Total Cholesterol: 172 mg/dL    PHQ Scores 9/29/2021 4/26/2021 10/19/2020 10/9/2019 7/19/2018   PHQ2 Score 2 0 0 0 0   PHQ9 Score 12 0 0 0 0     Interpretation of Total Score Depression Severity: 1-4 = Minimal depression, 5-9 = Mild depression, 10-14 = Moderate depression, 15-19 = Moderately severe depression, 20-27 = Severe depression      DIANE 7 score is 15 = severe anxiety. Assessment:      1. Annual physical exam  Update HM issues. Discussed colon cancer screening at age 41 but unfortunately most insurances have not covered so far. - Comprehensive Metabolic Panel  - CBC Auto Differential  - Lipid Panel  - TSH with Reflex    2. Essential hypertension  BP: 102/70   At goal and meeting medical guidelines. Continue treatment. - Comprehensive Metabolic Panel  - Lipid Panel    3. DIANE (generalized anxiety disorder)  Also encouraged therapy. She will think about this. Discussed medication.   - FLUoxetine (PROZAC) 20 MG capsule; Take 1 capsule by mouth daily  Dispense: 30 capsule; Refill: 1  - TSH with Reflex    4. Need for influenza vaccination  - INFLUENZA, MDCK QUADV, 2 YRS AND OLDER, IM, PF, PREFILL SYR OR SDV, 0.5ML (FLUCELVAX QUADV, PF)          Plan:    See orders and patient instructions. Age-appropriate preventative issues discussed and hand out given. Follow up:  1 year or sooner if issues. An electronic signature was used to authenticate this note.     --Alexander Kitchen MD on 9/29/2021

## 2021-09-29 NOTE — PATIENT INSTRUCTIONS
medium-sized piece of fruit, ½ cup chopped or canned fruit, 1/4 cup dried fruit, or 4 ounces (½ cup) of fruit juice. Choose fruit more often than fruit juice. · Eat 4 to 5 servings of vegetables each day. A serving is 1 cup of lettuce or raw leafy vegetables, ½ cup of chopped or cooked vegetables, or 4 ounces (½ cup) of vegetable juice. Choose vegetables more often than vegetable juice. · Get 2 to 3 servings of low-fat and fat-free dairy each day. A serving is 8 ounces of milk, 1 cup of yogurt, or 1 ½ ounces of cheese. · Eat 6 to 8 servings of grains each day. A serving is 1 slice of bread, 1 ounce of dry cereal, or ½ cup of cooked rice, pasta, or cooked cereal. Try to choose whole-grain products as much as possible. · Limit lean meat, poultry, and fish to 2 servings each day. A serving is 3 ounces, about the size of a deck of cards. · Eat 4 to 5 servings of nuts, seeds, and legumes (cooked dried beans, lentils, and split peas) each week. A serving is 1/3 cup of nuts, 2 tablespoons of seeds, or ½ cup of cooked beans or peas. · Limit fats and oils to 2 to 3 servings each day. A serving is 1 teaspoon of vegetable oil or 2 tablespoons of salad dressing. · Limit sweets and added sugars to 5 servings or less a week. A serving is 1 tablespoon jelly or jam, ½ cup sorbet, or 1 cup of lemonade. · (Eat less than 2,300 milligrams (mg) of sodium a day. If you limit your sodium to 1,500 mg a day, you can lower your blood pressure even more.)  · Be aware that all of these are the suggested number of servings for people who eat 1,800 to 2,000 calories a day. Your recommended number of servings may be different if you need more or fewer calories. Tips for success  · Start small. Do not try to make dramatic changes to your diet all at once. You might feel that you are missing out on your favorite foods and then be more likely to not follow the plan. Make small changes, and stick with them.  Once those changes become habit, add a few more changes. · Try some of the following:  ? Make it a goal to eat a fruit or vegetable at every meal and at snacks. This will make it easy to get the recommended amount of fruits and vegetables each day. ? Try yogurt topped with fruit and nuts for a snack or healthy dessert. ? Add lettuce, tomato, cucumber, and onion to sandwiches. ? Combine a ready-made pizza crust with low-fat mozzarella cheese and lots of vegetable toppings. Try using tomatoes, squash, spinach, broccoli, carrots, cauliflower, and onions. ? Have a variety of cut-up vegetables with a low-fat dip as an appetizer instead of chips and dip. ? Sprinkle sunflower seeds or chopped almonds over salads. Or try adding chopped walnuts or almonds to cooked vegetables. ? Try some vegetarian meals using beans and peas. Add garbanzo or kidney beans to salads. Make burritos and tacos with mashed farooq beans or black beans. Where can you learn more? Go to https://[employer]pepiceweb.[employer]. org and sign in to your [employer] account. Enter A013 in the [employer] box to learn more about \"DASH Diet: Care Instructions. \"     If you do not have an account, please click on the \"Sign Up Now\" link. Current as of: April 29, 2021               Content Version: 13.0  © 2006-2021 Healthwise, [facility]. Care instructions adapted under license by [facility] ([facility]). If you have questions about a medical condition or this instruction, always ask your healthcare professional. [place] [address] any warranty or liability for your use of this information. Patient Education        Learning About the Mediterranean Diet  What is the [address]? The Mediterranean diet is a style of eating rather than a diet plan. It features foods eaten in [place], [place], [place] and [place], and other countries along the [facility].  It emphasizes eating foods like fish, fruits, vegetables, beans, high-fiber breads and whole grains, nuts, and olive oil. This style of eating includes limited red meat, cheese, and sweets. Why choose the Mediterranean diet? A Mediterranean-style diet may improve heart health. It contains more fat than other heart-healthy diets. But the fats are mainly from nuts, unsaturated oils (such as fish oils and olive oil), and certain nut or seed oils (such as canola, soybean, or flaxseed oil). These fats may help protect the heart and blood vessels. How can you get started on the Mediterranean diet? Here are some things you can do to switch to a more Mediterranean way of eating. What to eat  · Eat a variety of fruits and vegetables each day, such as grapes, blueberries, tomatoes, broccoli, peppers, figs, olives, spinach, eggplant, beans, lentils, and chickpeas. · Eat a variety of whole-grain foods each day, such as oats, brown rice, and whole wheat bread, pasta, and couscous. · Eat fish at least 2 times a week. Try tuna, salmon, mackerel, lake trout, herring, or sardines. · Eat moderate amounts of low-fat dairy products, such as milk, cheese, or yogurt. · Eat moderate amounts of poultry and eggs. · Choose healthy (unsaturated) fats, such as nuts, olive oil, and certain nut or seed oils like canola, soybean, and flaxseed. · Limit unhealthy (saturated) fats, such as butter, palm oil, and coconut oil. And limit fats found in animal products, such as meat and dairy products made with whole milk. Try to eat red meat only a few times a month in very small amounts. · Limit sweets and desserts to only a few times a week. This includes sugar-sweetened drinks like soda. The Mediterranean diet may also include red wine with your meal1 glass each day for women and up to 2 glasses a day for men. Tips for eating at home  · Use herbs, spices, garlic, lemon zest, and citrus juice instead of salt to add flavor to foods. · Add avocado slices to your sandwich instead of garcia.   · Have fish for lunch or dinner instead

## 2021-10-06 LAB
ALBUMIN SERPL-MCNC: 4.4 G/DL (ref 3.5–5.7)
ALP BLD-CCNC: 34 IU/L (ref 35–135)
ALT SERPL-CCNC: 20 IU/L (ref 10–60)
ANION GAP SERPL CALCULATED.3IONS-SCNC: 5 MMOL/L (ref 6–18)
AST SERPL-CCNC: 19 IU/L (ref 10–40)
BASOPHILS ABSOLUTE: 0 %
BASOPHILS ABSOLUTE: 0 THOU/MCL (ref 0–0.2)
BILIRUB SERPL-MCNC: 0.6 MG/DL (ref 0–1.2)
BUN BLDV-MCNC: 18 MG/DL (ref 8–26)
CALCIUM SERPL-MCNC: 9.6 MG/DL (ref 8.5–10.4)
CHLORIDE BLD-SCNC: 105 MEQ/L (ref 98–111)
CHOLESTEROL, TOTAL: 195 MG/DL
CO2: 28 MMOL/L (ref 21–31)
CREAT SERPL-MCNC: 1.53 MG/DL (ref 0.6–1.2)
EOSINOPHILS ABSOLUTE: 0.1 THOU/MCL (ref 0.03–0.45)
EOSINOPHILS RELATIVE PERCENT: 2 %
GFR AFRICAN AMERICAN: 45 ML/MIN/1.73 M2
GFR NON-AFRICAN AMERICAN: 39 ML/MIN/1.73 M2
GLUCOSE BLD-MCNC: 87 MG/DL (ref 70–99)
HCT VFR BLD CALC: 40.4 % (ref 36–46)
HDLC SERPL-MCNC: 57 MG/DL
HEMOGLOBIN: 13.5 G/DL (ref 12–15.2)
LDL CHOLESTEROL CALCULATED: 123 MG/DL
LYMPHOCYTES ABSOLUTE: 1.7 THOU/MCL (ref 1–4)
LYMPHOCYTES RELATIVE PERCENT: 34 %
MCH RBC QN AUTO: 31.9 PG (ref 27–33)
MCHC RBC AUTO-ENTMCNC: 33.4 G/DL (ref 32–36)
MCV RBC AUTO: 95.7 FL (ref 82–97)
MONOCYTES # BLD: 7 %
MONOCYTES ABSOLUTE: 0.3 THOU/MCL (ref 0.2–0.9)
NEUTROPHILS ABSOLUTE: 2.9 THOU/MCL (ref 1.8–7.7)
NONHDLC SERPL-MCNC: 138 MG/DL
PDW BLD-RTO: 12.7 % (ref 12.3–17)
PLATELET # BLD: 211 THOU/MCL (ref 140–375)
PMV BLD AUTO: 9.9 FL (ref 7.4–11.5)
POTASSIUM SERPL-SCNC: 4.4 MEQ/L (ref 3.6–5.1)
RBC # BLD: 4.22 MIL/MCL (ref 3.8–5.2)
SEG NEUTROPHILS: 57 %
SODIUM BLD-SCNC: 138 MEQ/L (ref 135–145)
TOTAL PROTEIN: 6.7 G/DL (ref 6–8)
TRIGL SERPL-MCNC: 75 MG/DL
TSH ULTRASENSITIVE: 2.94 MCIU/ML (ref 0.27–4.2)
WBC # BLD: 5 THOU/MCL (ref 3.6–10.5)

## 2021-11-26 DIAGNOSIS — F41.1 GAD (GENERALIZED ANXIETY DISORDER): ICD-10-CM

## 2021-11-29 RX ORDER — FLUOXETINE HYDROCHLORIDE 20 MG/1
20 CAPSULE ORAL DAILY
Qty: 30 CAPSULE | Refills: 1 | Status: SHIPPED | OUTPATIENT
Start: 2021-11-29 | End: 2022-10-13 | Stop reason: ALTCHOICE

## 2021-12-06 PROBLEM — E83.39 HYPOPHOSPHATEMIA: Status: ACTIVE | Noted: 2021-12-06

## 2022-10-13 ENCOUNTER — OFFICE VISIT (OUTPATIENT)
Dept: INTERNAL MEDICINE CLINIC | Age: 52
End: 2022-10-13
Payer: COMMERCIAL

## 2022-10-13 VITALS
DIASTOLIC BLOOD PRESSURE: 70 MMHG | HEIGHT: 63 IN | HEART RATE: 56 BPM | SYSTOLIC BLOOD PRESSURE: 110 MMHG | BODY MASS INDEX: 26.4 KG/M2 | WEIGHT: 149 LBS

## 2022-10-13 DIAGNOSIS — N18.31 CHRONIC KIDNEY DISEASE, STAGE 3A (HCC): ICD-10-CM

## 2022-10-13 DIAGNOSIS — I10 ESSENTIAL HYPERTENSION: ICD-10-CM

## 2022-10-13 DIAGNOSIS — R53.82 CHRONIC FATIGUE: ICD-10-CM

## 2022-10-13 DIAGNOSIS — Z00.00 ENCOUNTER FOR WELL ADULT EXAM WITHOUT ABNORMAL FINDINGS: Primary | ICD-10-CM

## 2022-10-13 DIAGNOSIS — Z11.59 ENCOUNTER FOR HEPATITIS C SCREENING TEST FOR LOW RISK PATIENT: ICD-10-CM

## 2022-10-13 PROCEDURE — 90674 CCIIV4 VAC NO PRSV 0.5 ML IM: CPT | Performed by: FAMILY MEDICINE

## 2022-10-13 PROCEDURE — 90471 IMMUNIZATION ADMIN: CPT | Performed by: FAMILY MEDICINE

## 2022-10-13 PROCEDURE — 99396 PREV VISIT EST AGE 40-64: CPT | Performed by: FAMILY MEDICINE

## 2022-10-13 SDOH — HEALTH STABILITY: PHYSICAL HEALTH: ON AVERAGE, HOW MANY DAYS PER WEEK DO YOU ENGAGE IN MODERATE TO STRENUOUS EXERCISE (LIKE A BRISK WALK)?: 6 DAYS

## 2022-10-13 SDOH — ECONOMIC STABILITY: FOOD INSECURITY: WITHIN THE PAST 12 MONTHS, THE FOOD YOU BOUGHT JUST DIDN'T LAST AND YOU DIDN'T HAVE MONEY TO GET MORE.: NEVER TRUE

## 2022-10-13 SDOH — ECONOMIC STABILITY: FOOD INSECURITY: WITHIN THE PAST 12 MONTHS, YOU WORRIED THAT YOUR FOOD WOULD RUN OUT BEFORE YOU GOT MONEY TO BUY MORE.: NEVER TRUE

## 2022-10-13 SDOH — HEALTH STABILITY: PHYSICAL HEALTH: ON AVERAGE, HOW MANY MINUTES DO YOU ENGAGE IN EXERCISE AT THIS LEVEL?: 40 MIN

## 2022-10-13 ASSESSMENT — PATIENT HEALTH QUESTIONNAIRE - PHQ9
SUM OF ALL RESPONSES TO PHQ QUESTIONS 1-9: 0
SUM OF ALL RESPONSES TO PHQ QUESTIONS 1-9: 0
9. THOUGHTS THAT YOU WOULD BE BETTER OFF DEAD, OR OF HURTING YOURSELF: 0
4. FEELING TIRED OR HAVING LITTLE ENERGY: 0
3. TROUBLE FALLING OR STAYING ASLEEP: 0
10. IF YOU CHECKED OFF ANY PROBLEMS, HOW DIFFICULT HAVE THESE PROBLEMS MADE IT FOR YOU TO DO YOUR WORK, TAKE CARE OF THINGS AT HOME, OR GET ALONG WITH OTHER PEOPLE: 0
6. FEELING BAD ABOUT YOURSELF - OR THAT YOU ARE A FAILURE OR HAVE LET YOURSELF OR YOUR FAMILY DOWN: 0
SUM OF ALL RESPONSES TO PHQ QUESTIONS 1-9: 0
SUM OF ALL RESPONSES TO PHQ QUESTIONS 1-9: 0
SUM OF ALL RESPONSES TO PHQ9 QUESTIONS 1 & 2: 0
5. POOR APPETITE OR OVEREATING: 0
7. TROUBLE CONCENTRATING ON THINGS, SUCH AS READING THE NEWSPAPER OR WATCHING TELEVISION: 0
8. MOVING OR SPEAKING SO SLOWLY THAT OTHER PEOPLE COULD HAVE NOTICED. OR THE OPPOSITE, BEING SO FIGETY OR RESTLESS THAT YOU HAVE BEEN MOVING AROUND A LOT MORE THAN USUAL: 0
2. FEELING DOWN, DEPRESSED OR HOPELESS: 0
1. LITTLE INTEREST OR PLEASURE IN DOING THINGS: 0

## 2022-10-13 ASSESSMENT — SOCIAL DETERMINANTS OF HEALTH (SDOH): HOW HARD IS IT FOR YOU TO PAY FOR THE VERY BASICS LIKE FOOD, HOUSING, MEDICAL CARE, AND HEATING?: NOT HARD AT ALL

## 2022-10-13 NOTE — PATIENT INSTRUCTIONS
Consider a sleep study if  you do not feel rested and no explanation on blood tests. Well Visit, Women 48 to 72: Care Instructions  Overview  Well visits can help you stay healthy. Your doctor has checked your overall health and may have suggested ways to take good care of yourself. Your doctor also may have recommended tests. At home, you can help prevent illness with healthy eating, regular exercise, and other steps. Follow-up care is a key part of your treatment and safety. Be sure to make and go to all appointments, and call your doctor if you are having problems. It's also a good idea to know your test results and keep a list of the medicines you take. How can you care for yourself at home? Get screening tests that you and your doctor decide on. Screening helps find diseases before any symptoms appear. Eat healthy foods. Choose fruits, vegetables, whole grains, protein, and low-fat dairy foods. Limit fat, especially saturated fat. Reduce salt in your diet. Limit alcohol. Have no more than 1 drink a day or 7 drinks a week. Get at least 30 minutes of exercise on most days of the week. Walking is a good choice. You also may want to do other activities, such as running, swimming, cycling, or playing tennis or team sports. Reach and stay at a healthy weight. This will lower your risk for many problems, such as obesity, diabetes, heart disease, and high blood pressure. Do not smoke. Smoking can make health problems worse. If you need help quitting, talk to your doctor about stop-smoking programs and medicines. These can increase your chances of quitting for good. Care for your mental health. It is easy to get weighed down by worry and stress. Learn strategies to manage stress, like deep breathing and mindfulness, and stay connected with your family and community. If you find you often feel sad or hopeless, talk with your doctor. Treatment can help.   Talk to your doctor about whether you have any risk factors for sexually transmitted infections (STIs). You can help prevent STIs if you wait to have sex with a new partner (or partners) until you've each been tested for STIs. It also helps if you use condoms (male or female condoms) and if you limit your sex partners to one person who only has sex with you. Vaccines are available for some STIs. If you think you may have a problem with alcohol or drug use, talk to your doctor. This includes prescription medicines (such as amphetamines and opioids) and illegal drugs (such as cocaine and methamphetamine). Your doctor can help you figure out what type of treatment is best for you. Protect your skin from too much sun. When you're outdoors from 10 a.m. to 4 p.m., stay in the shade or cover up with clothing and a hat with a wide brim. Wear sunglasses that block UV rays. Even when it's cloudy, put broad-spectrum sunscreen (SPF 30 or higher) on any exposed skin. See a dentist one or two times a year for checkups and to have your teeth cleaned. Wear a seat belt in the car. When should you call for help? Watch closely for changes in your health, and be sure to contact your doctor if you have any problems or symptoms that concern you. Where can you learn more? Go to https://[email or web address] org and sign in to your [employer] account. Enter K940 in the [facility] box to learn more about \"Well Visit, Women 50 to 72: Care Instructions. \"     If you do not have an account, please click on the \"Sign Up Now\" link. Current as of: June 6, 2022               Content Version: 13.4  © 8734-5510 Healthwise, Incorporated. Care instructions adapted under license by [facility] ([facility]). If you have questions about a medical condition or this instruction, always ask your healthcare professional. [place] [address] any warranty or liability for your use of this information.

## 2022-10-13 NOTE — PROGRESS NOTES
Well Adult Note  Name: Aron Justin Date: 10/13/2022   MRN: [id number] Sex: Female   Age: 50 y.o. Ethnicity: Non- / Non    : 1970 Race: White (non-)      Penny Sandoval is here for well adult exam.  History:  See ROS    Fatigue is chronic. Sleeps well. Denies snoring and does not appear restless per bed covers. In bed to sleep 10:30 to 11 PM and up at 6:45 AM.   Does not feels sleepy during the day. Just tired. Review of Systems   Constitutional:  Positive for fatigue. Cardiovascular:  Positive for leg swelling (ankles and feet. ). Allergies   Allergen Reactions    No Known Allergies          Prior to Visit Medications    Medication Sig Taking? Authorizing Provider   lisinopril (PRINIVIL;ZESTRIL) 10 MG tablet TAKE 1 TABLET DAILY Yes Avi Armstrong MD   norethindrone (AYGESTIN) 5 MG tablet Take 1/2 of one tablet daily po Yes Historical Provider, MD   estradiol (ESTRACE) 2 MG tablet Take 1 mg by mouth daily Yes Historical Provider, MD   azelastine (ASTELIN) 0.1 % nasal spray 1-2 spray in each nostril twice daily as needed. Yes Damian Malone MD   calcium carbonate (OSCAL) 500 MG TABS tablet Take 500 mg by mouth daily Yes Historical Provider, MD   potassium & sodium phosphates (PHOS-NAK) 280-160-250 MG PACK Take 1 packet by mouth 2 times daily  Avi Armstrong MD   butalbital-acetaminophen-caffeine (FIORICET, ESGIC) -40 MG per tablet Take 1 tablet by mouth every 4 hours as needed for Headaches Limit use to 2 days per week. Do not use daily. Patient not taking: Reported on 10/13/2022  Damian Malone MD         Past Medical History:   Diagnosis Date    Anemia     Blood transfusion     Chronic kidney disease     est GFR underestimates true function;  stage 2 with est GFR of 75 with creatinine of 1.4    Endometriosis     Removed ovaries due to this disease.     Hypertension     Osteoporosis     forearm density -3;  all else osteopenia Recurrent UTI (urinary tract infection)     Tic disorder, transient, recurrent 10/19/2020       Past Surgical History:   Procedure Laterality Date    APPENDECTOMY  2000    COLECTOMY  2000    small section    SALPINGO-OOPHORECTOMY  ,     BSO- complicated with pneumonia/sepsis;  REMOVED FOR ENDOMETRIOSIS    URETER STENT PLACEMENT  2003    then removed in 10/03         Family History   Problem Relation Age of Onset    Arthritis Father         wheelchair primarily and inactive    High Cholesterol Father     Dementia Father         Lewy Body.  age 68    Diabetes Mother     Hypertension Mother     Mental Illness Sister         Bipolar, ADHD    Migraines Sister     Asthma Sister     Other Other         adopted son, born ; Asperger's mild    Other Brother         GI issues, possibly Crohns       Social History     Tobacco Use    Smoking status: Never    Smokeless tobacco: Never   Vaping Use    Vaping Use: Never used   Substance Use Topics    Alcohol use: No    Drug use: No       Objective   /70   Pulse 56   Ht 5' 3\" (1.6 m)   Wt 149 lb (67.6 kg)   BMI 26.39 kg/m²   Wt Readings from Last 3 Encounters:   10/13/22 149 lb (67.6 kg)   22 147 lb (66.7 kg)   21 145 lb (65.8 kg)     There were no vitals filed for this visit. Physical Exam  Vitals reviewed. Constitutional:       General: She is not in acute distress. Appearance: Normal appearance. She is well-developed. She is not diaphoretic. Comments: Pear shaped build so BMI of 26 is OK   Eyes:      General: No scleral icterus. Neck:      Thyroid: No thyroid mass or thyromegaly. Vascular: No carotid bruit. Cardiovascular:      Rate and Rhythm: Normal rate and regular rhythm. Heart sounds: Normal heart sounds, S1 normal and S2 normal. No murmur heard. Pulmonary:      Effort: Pulmonary effort is normal. No respiratory distress. Breath sounds: Normal breath sounds.  No decreased breath sounds, wheezing, rhonchi or rales. Abdominal:      General: Bowel sounds are normal. There is no abdominal bruit. Palpations: Abdomen is soft. There is no hepatomegaly or mass. Tenderness: There is no abdominal tenderness. Musculoskeletal:      Cervical back: Neck supple. Right lower leg: No edema. Left lower leg: No edema. Lymphadenopathy:      Cervical: No cervical adenopathy. Skin:     General: Skin is warm and dry. Coloration: Skin is not pale. Nails: There is no clubbing. Neurological:      Mental Status: She is alert and oriented to person, place, and time. Motor: No tremor or abnormal muscle tone. Coordination: Coordination normal.      Gait: Gait normal.   Psychiatric:         Speech: Speech normal.         Behavior: Behavior normal.         Assessment   Plan   1.  Encounter for well adult exam without abnormal findings       Personalized Preventive Plan   Current Health Maintenance Status  Immunization History   Administered Date(s) Administered    COVID-19, MODERNA BLUE border, Primary or Immunocompromised, (age 12y+), IM, 100 mcg/0.5mL 03/26/2021    DTaP 09/01/2010    Influenza 10/06/2011, 10/12/2012, 10/30/2013    Influenza Virus Vaccine 10/06/2011, 11/12/2014, 10/09/2015, 10/07/2018    Influenza, AFLURIA (age 1 yrs+), FLUZONE, (age 10 mo+), MDV, 0.5mL 10/12/2012, 10/30/2013    Influenza, FLUARIX, FLULAVAL, FLUZONE (age 10 mo+) AND AFLURIA, (age 1 y+), PF, 0.5mL 09/28/2016, 11/02/2018, 10/09/2019, 10/19/2020    Influenza, FLUCELVAX, (age 10 mo+), MDCK, PF, 0.5mL 10/11/2017, 09/29/2021    Tdap (Boostrix, Adacel) 10/19/2020, 04/14/2021        Health Maintenance   Topic Date Due    Hepatitis C screen  Never done    COVID-19 Vaccine (2 - Lavone Pennie series) 04/23/2021    Colorectal Cancer Screen  Never done    Shingles vaccine (1 of 2) Never done    Cervical cancer screen  05/01/2022    Flu vaccine (1) 08/01/2022    Depression Screen  09/29/2022    Breast cancer screen  02/09/2024    Lipids  10/06/2026    DEXA (modify frequency per FRAX score)  03/21/2027    DTaP/Tdap/Td vaccine (4 - Td or Tdap) 04/14/2031    Hepatitis A vaccine  Aged Out    Hib vaccine  Aged Out    Meningococcal (ACWY) vaccine  Aged Out    Pneumococcal 0-64 years Vaccine  Aged Out    HIV screen  Discontinued     Recommendations for [place] Due: see orders and patient instructions/AVS.    1 year. Sooner if symptoms or new issues.

## 2022-10-17 DIAGNOSIS — E53.8 B12 DEFICIENCY: Primary | ICD-10-CM

## 2022-10-17 PROBLEM — S61.412A LACERATION OF LEFT HAND WITHOUT FOREIGN BODY: Status: RESOLVED | Noted: 2021-04-26 | Resolved: 2022-10-17

## 2022-10-17 LAB
ALBUMIN SERPL-MCNC: 4.3 G/DL (ref 3.5–5.7)
ALP BLD-CCNC: 37 IU/L (ref 35–135)
ALT SERPL-CCNC: 16 IU/L (ref 10–60)
ANION GAP SERPL CALCULATED.3IONS-SCNC: 4 MMOL/L (ref 6–18)
AST SERPL-CCNC: 17 IU/L (ref 10–40)
BASOPHILS ABSOLUTE: 0 THOU/MCL (ref 0–0.2)
BASOPHILS ABSOLUTE: 1 %
BILIRUB SERPL-MCNC: 0.7 MG/DL (ref 0–1.2)
BUN BLDV-MCNC: 19 MG/DL (ref 8–26)
CALCIUM SERPL-MCNC: 8.8 MG/DL (ref 8.5–10.4)
CHLORIDE BLD-SCNC: 105 MEQ/L (ref 98–111)
CHOLESTEROL, TOTAL: 178 MG/DL
CO2: 27 MMOL/L (ref 21–31)
CREAT SERPL-MCNC: 1.48 MG/DL (ref 0.6–1.2)
EGFR (CKD-EPI): 43 ML/MIN/1.73 M2
EOSINOPHILS ABSOLUTE: 0.1 THOU/MCL (ref 0.03–0.45)
EOSINOPHILS RELATIVE PERCENT: 2 %
GLUCOSE BLD-MCNC: 87 MG/DL (ref 70–99)
HCT VFR BLD CALC: 39.3 % (ref 36–46)
HDLC SERPL-MCNC: 51 MG/DL
HEMOGLOBIN: 13.5 G/DL (ref 12–15.2)
LDL CHOLESTEROL CALCULATED: 109 MG/DL
LYMPHOCYTES ABSOLUTE: 1.3 THOU/MCL (ref 1–4)
LYMPHOCYTES RELATIVE PERCENT: 26 %
MAGNESIUM: 1.9 MG/DL (ref 1.7–2.4)
MCH RBC QN AUTO: 32.4 PG (ref 27–33)
MCHC RBC AUTO-ENTMCNC: 34.4 G/DL (ref 32–36)
MCV RBC AUTO: 94.1 FL (ref 82–97)
MONOCYTES # BLD: 6 %
MONOCYTES ABSOLUTE: 0.3 THOU/MCL (ref 0.2–0.9)
NEUTROPHILS ABSOLUTE: 3.4 THOU/MCL (ref 1.8–7.7)
NONHDLC SERPL-MCNC: 127 MG/DL
PARATHYROID HORMONE INTACT: 69 PG/ML (ref 15–65)
PDW BLD-RTO: 12.9 % (ref 12.3–17)
PHOSPHORUS: 2.3 MG/DL (ref 2.5–4.5)
PLATELET # BLD: 187 THOU/MCL (ref 140–375)
PMV BLD AUTO: 10 FL (ref 7.4–11.5)
POTASSIUM SERPL-SCNC: 4.1 MEQ/L (ref 3.6–5.1)
RBC # BLD: 4.18 MIL/MCL (ref 3.8–5.2)
SEG NEUTROPHILS: 65 %
SODIUM BLD-SCNC: 136 MEQ/L (ref 135–145)
TOTAL PROTEIN: 6.6 G/DL (ref 6–8)
TRIGL SERPL-MCNC: 91 MG/DL
TSH ULTRASENSITIVE: 3.2 MCIU/ML (ref 0.27–4.2)
VITAMIN B-12: 181 PG/ML (ref 232–1245)
VITAMIN D 25-HYDROXY: 72.8 NG/ML (ref 30–150)
WBC # BLD: 5.2 THOU/MCL (ref 3.6–10.5)

## 2022-10-17 RX ORDER — LANOLIN ALCOHOL/MO/W.PET/CERES
1000 CREAM (GRAM) TOPICAL DAILY
Qty: 100 TABLET | COMMUNITY
Start: 2022-10-17

## 2023-10-15 ASSESSMENT — PATIENT HEALTH QUESTIONNAIRE - PHQ9
SUM OF ALL RESPONSES TO PHQ QUESTIONS 1-9: 0
1. LITTLE INTEREST OR PLEASURE IN DOING THINGS: NOT AT ALL
1. LITTLE INTEREST OR PLEASURE IN DOING THINGS: 0
2. FEELING DOWN, DEPRESSED OR HOPELESS: NOT AT ALL
SUM OF ALL RESPONSES TO PHQ9 QUESTIONS 1 & 2: 0
SUM OF ALL RESPONSES TO PHQ9 QUESTIONS 1 & 2: 0
2. FEELING DOWN, DEPRESSED OR HOPELESS: 0
SUM OF ALL RESPONSES TO PHQ QUESTIONS 1-9: 0

## 2023-10-16 ENCOUNTER — OFFICE VISIT (OUTPATIENT)
Dept: INTERNAL MEDICINE CLINIC | Age: 53
End: 2023-10-16
Payer: COMMERCIAL

## 2023-10-16 VITALS
WEIGHT: 145.4 LBS | OXYGEN SATURATION: 98 % | BODY MASS INDEX: 25.76 KG/M2 | HEIGHT: 63 IN | SYSTOLIC BLOOD PRESSURE: 118 MMHG | HEART RATE: 64 BPM | DIASTOLIC BLOOD PRESSURE: 64 MMHG

## 2023-10-16 DIAGNOSIS — Z23 NEED FOR INFLUENZA VACCINATION: Primary | ICD-10-CM

## 2023-10-16 DIAGNOSIS — N18.30 STAGE 3 CHRONIC KIDNEY DISEASE, UNSPECIFIED WHETHER STAGE 3A OR 3B CKD (HCC): ICD-10-CM

## 2023-10-16 DIAGNOSIS — M85.89 OSTEOPENIA OF MULTIPLE SITES: ICD-10-CM

## 2023-10-16 DIAGNOSIS — Z00.00 ANNUAL PHYSICAL EXAM: ICD-10-CM

## 2023-10-16 DIAGNOSIS — Z12.31 ENCOUNTER FOR SCREENING MAMMOGRAM FOR BREAST CANCER: ICD-10-CM

## 2023-10-16 DIAGNOSIS — Z12.11 COLON CANCER SCREENING: ICD-10-CM

## 2023-10-16 DIAGNOSIS — E53.8 B12 DEFICIENCY: ICD-10-CM

## 2023-10-16 DIAGNOSIS — G24.3 CERVICAL DYSTONIA: ICD-10-CM

## 2023-10-16 PROBLEM — Z63.79 STRESS DUE TO ILLNESS OF FAMILY MEMBER: Status: RESOLVED | Noted: 2021-04-26 | Resolved: 2023-10-16

## 2023-10-16 PROCEDURE — 99396 PREV VISIT EST AGE 40-64: CPT | Performed by: FAMILY MEDICINE

## 2023-10-16 PROCEDURE — 3078F DIAST BP <80 MM HG: CPT | Performed by: FAMILY MEDICINE

## 2023-10-16 PROCEDURE — 90674 CCIIV4 VAC NO PRSV 0.5 ML IM: CPT | Performed by: FAMILY MEDICINE

## 2023-10-16 PROCEDURE — 3074F SYST BP LT 130 MM HG: CPT | Performed by: FAMILY MEDICINE

## 2023-10-16 PROCEDURE — 90471 IMMUNIZATION ADMIN: CPT | Performed by: FAMILY MEDICINE

## 2023-10-16 SDOH — HEALTH STABILITY: PHYSICAL HEALTH: ON AVERAGE, HOW MANY MINUTES DO YOU ENGAGE IN EXERCISE AT THIS LEVEL?: 50 MIN

## 2023-10-16 SDOH — HEALTH STABILITY: PHYSICAL HEALTH: ON AVERAGE, HOW MANY DAYS PER WEEK DO YOU ENGAGE IN MODERATE TO STRENUOUS EXERCISE (LIKE A BRISK WALK)?: 6 DAYS

## 2023-10-16 NOTE — PROGRESS NOTES
Chief Complaint   Patient presents with    Annual Exam     Fasting        PREVENTATIVE VISIT AND EXAM      Overall doing fine. More energy since starting B12. Takes 1000  mcg every other day. Seeing nephrology for her CKD. Has open orders. Neurologist diagnosed her with cervical dystonia. Offered Botox. She is not sure if she wants to do this. Discussed. Has a spot on the back of the lower neck, upper shoulder right post.  It is bump. Cannot see it. Patient Active Problem List   Diagnosis    Osteopenia of multiple sites    Premature surgical menopause    High serum creatine - creat clearance on 24 hr urine much higher than calculated. Anemia    Disorder of kidney and ureter    Endometriosis    Hematuria    History of endometriosis    Isolated proteinuria    Essential hypertension    Vasomotor rhinitis    Chronic kidney disease, stage 3a (HCC)    Stress due to illness of family member    DIANE (generalized anxiety disorder)    Hypophosphatemia    B12 deficiency    Cervical dystonia       Past medical history, social history, family history reviewed or updated. Outpatient Medications Marked as Taking for the 10/16/23 encounter (Office Visit) with John Correa MD   Medication Sig Dispense Refill    lisinopril (PRINIVIL;ZESTRIL) 10 MG tablet TAKE 1 TABLET DAILY 90 tablet 1    potassium & sodium phosphates (PHOS-NAK) 280-160-250 MG PACK USE 1 PACKET AS DIRECTED TWICE DAILY 60 each 3    vitamin B-12 (CYANOCOBALAMIN) 1000 MCG tablet Take 1 tablet by mouth daily (Patient taking differently: Take 1 tablet by mouth every other day) 100 tablet PRN    norethindrone (AYGESTIN) 5 MG tablet Take 1/2 of one tablet daily po      estradiol (ESTRACE) 2 MG tablet Take 0.5 tablets by mouth daily      calcium carbonate (OSCAL) 500 MG TABS tablet Take 1 tablet by mouth daily             ROS:  Full 12 system review done and all negative except for the following:    All neg x HPI      Physical Exam

## 2023-10-18 LAB
ALBUMIN SERPL-MCNC: 4.4 G/DL (ref 3.5–5.7)
ALP BLD-CCNC: 32 IU/L (ref 35–135)
ALT SERPL-CCNC: 13 IU/L (ref 10–60)
AST SERPL-CCNC: 14 IU/L (ref 10–40)
BILIRUB SERPL-MCNC: 0.4 MG/DL (ref 0–1.2)
BILIRUBIN DIRECT: 0.1 MG/DL (ref 0–0.2)
CHOLESTEROL, TOTAL: 184 MG/DL
HDLC SERPL-MCNC: 49 MG/DL
LDL CHOLESTEROL CALCULATED: 120 MG/DL
NONHDLC SERPL-MCNC: 135 MG/DL
TOTAL PROTEIN: 6.4 G/DL (ref 6–8)
TRIGL SERPL-MCNC: 75 MG/DL

## 2023-10-19 LAB — VITAMIN B-12: 665 PG/ML (ref 232–1245)

## 2023-10-24 ENCOUNTER — TELEPHONE (OUTPATIENT)
Dept: INTERNAL MEDICINE CLINIC | Age: 53
End: 2023-10-24

## 2023-10-24 NOTE — TELEPHONE ENCOUNTER
Sylvia Persaud did not realize you were not going to [facility] to have your blood tests, so [facility] did not do all of the labs needed. I did not re-order the tests that the kidney specialty office wanted because they were on file in the [facility] lab computer to be done. You must not have given the [facility] lab that order. Therefore your labs are incomplete. You do not need to fast for the blood test and I encourage you to get them done soon. If you cannot use a [facility] lab due to insurance, let us know and what [facility] location you use, so we can FAX the order to them. Result that were done show cholesterol is borderline high. It is at a level to work on improved heart healthy diet and regular exercise.      Liver profile and B12 are normal.

## 2023-11-02 LAB — NONINV COLON CA DNA+OCC BLD SCRN STL QL: NEGATIVE

## 2024-06-13 ENCOUNTER — TELEPHONE (OUTPATIENT)
Dept: INTERNAL MEDICINE CLINIC | Age: 54
End: 2024-06-13

## 2024-06-13 NOTE — TELEPHONE ENCOUNTER
Called pt to schedule mammogram.  She states she goes to [facility] for her mammgrams.  She will call to schedule.    [Negative] : Endocrine

## 2024-10-18 ENCOUNTER — TELEPHONE (OUTPATIENT)
Dept: INTERNAL MEDICINE CLINIC | Age: 54
End: 2024-10-18

## 2024-10-18 DIAGNOSIS — E53.8 B12 DEFICIENCY: ICD-10-CM

## 2024-10-18 DIAGNOSIS — Z00.00 PREVENTATIVE HEALTH CARE: Primary | ICD-10-CM

## 2024-10-18 NOTE — TELEPHONE ENCOUNTER
Pt has annual physical scheduled on 10/24, pt asks if Dr. Castro would be able to order labs prior to appt to have results to review at appt. Please advise.

## 2024-10-23 ASSESSMENT — PATIENT HEALTH QUESTIONNAIRE - PHQ9
2. FEELING DOWN, DEPRESSED OR HOPELESS: NOT AT ALL
SUM OF ALL RESPONSES TO PHQ9 QUESTIONS 1 & 2: 0
SUM OF ALL RESPONSES TO PHQ9 QUESTIONS 1 & 2: 0
SUM OF ALL RESPONSES TO PHQ QUESTIONS 1-9: 0
SUM OF ALL RESPONSES TO PHQ QUESTIONS 1-9: 0
1. LITTLE INTEREST OR PLEASURE IN DOING THINGS: NOT AT ALL
1. LITTLE INTEREST OR PLEASURE IN DOING THINGS: NOT AT ALL
SUM OF ALL RESPONSES TO PHQ QUESTIONS 1-9: 0
2. FEELING DOWN, DEPRESSED OR HOPELESS: NOT AT ALL
SUM OF ALL RESPONSES TO PHQ QUESTIONS 1-9: 0

## 2024-10-24 ENCOUNTER — OFFICE VISIT (OUTPATIENT)
Dept: INTERNAL MEDICINE CLINIC | Age: 54
End: 2024-10-24

## 2024-10-24 VITALS
BODY MASS INDEX: 23.57 KG/M2 | HEIGHT: 63 IN | HEART RATE: 84 BPM | WEIGHT: 133 LBS | DIASTOLIC BLOOD PRESSURE: 62 MMHG | SYSTOLIC BLOOD PRESSURE: 91 MMHG | OXYGEN SATURATION: 99 %

## 2024-10-24 DIAGNOSIS — Z23 NEED FOR INFLUENZA VACCINATION: ICD-10-CM

## 2024-10-24 DIAGNOSIS — Z00.00 ANNUAL PHYSICAL EXAM: Primary | ICD-10-CM

## 2024-10-24 DIAGNOSIS — I10 ESSENTIAL HYPERTENSION: ICD-10-CM

## 2024-10-24 DIAGNOSIS — Z12.31 ENCOUNTER FOR SCREENING MAMMOGRAM FOR BREAST CANCER: ICD-10-CM

## 2024-10-24 DIAGNOSIS — R53.82 CHRONIC FATIGUE: ICD-10-CM

## 2024-10-24 SDOH — ECONOMIC STABILITY: INCOME INSECURITY: HOW HARD IS IT FOR YOU TO PAY FOR THE VERY BASICS LIKE FOOD, HOUSING, MEDICAL CARE, AND HEATING?: NOT HARD AT ALL

## 2024-10-24 SDOH — ECONOMIC STABILITY: FOOD INSECURITY: WITHIN THE PAST 12 MONTHS, YOU WORRIED THAT YOUR FOOD WOULD RUN OUT BEFORE YOU GOT MONEY TO BUY MORE.: NEVER TRUE

## 2024-10-24 SDOH — HEALTH STABILITY: PHYSICAL HEALTH: ON AVERAGE, HOW MANY DAYS PER WEEK DO YOU ENGAGE IN MODERATE TO STRENUOUS EXERCISE (LIKE A BRISK WALK)?: 6 DAYS

## 2024-10-24 SDOH — ECONOMIC STABILITY: FOOD INSECURITY: WITHIN THE PAST 12 MONTHS, THE FOOD YOU BOUGHT JUST DIDN'T LAST AND YOU DIDN'T HAVE MONEY TO GET MORE.: NEVER TRUE

## 2024-10-24 SDOH — HEALTH STABILITY: PHYSICAL HEALTH: ON AVERAGE, HOW MANY MINUTES DO YOU ENGAGE IN EXERCISE AT THIS LEVEL?: 40 MIN

## 2024-10-24 NOTE — PROGRESS NOTES
Chief Complaint   Patient presents with    Annual Exam       PREVENTATIVE VISIT AND EXAM      She feels tired a lot.  She goes to bed at 10:30 PM and falls asleep quickly.  She said she does not usually wake up in the middle the night and gets up at 6:30 AM.  Knowing complains about her snoring but she has not specifically asked.  She exercises for 45 minutes 6 days a week.  She does not think it is too aggressive.  She does not have a physical job which would tire her out.    She has been diagnosed as having cervical dystonia.  She did receive a Botox injection which helped just a little bit.  This was the lowest dose that they could give though.  She will be following up with neurology at [facility].      Patient Active Problem List   Diagnosis    Osteopenia of multiple sites    Premature surgical menopause    High serum creatine - creat clearance on 24 hr urine much higher than calculated.    Anemia    Disorder of kidney and ureter    Endometriosis    Hematuria    History of endometriosis    Isolated proteinuria    Essential hypertension    Vasomotor rhinitis    Stage 3 chronic kidney disease (HCC)    DIANE (generalized anxiety disorder)    Hypophosphatemia    B12 deficiency    Cervical dystonia       Past medical history, social history, family history reviewed or updated.      Outpatient Medications Marked as Taking for the 10/24/24 encounter (Office Visit) with Angelina Castro MD   Medication Sig Dispense Refill    potassium & sodium phosphates (PHOS-NAK) 280-160-250 MG PACK USE 1 PACKET AS DIRECTED TWICE DAILY 60 each 3    lisinopril (PRINIVIL;ZESTRIL) 10 MG tablet Take 1 tablet by mouth daily 90 tablet 1    vitamin B-12 (CYANOCOBALAMIN) 1000 MCG tablet Take 1 tablet by mouth daily (Patient taking differently: Take 1 tablet by mouth every other day) 100 tablet PRN    norethindrone (AYGESTIN) 5 MG tablet Take 1/2 of one tablet daily po      estradiol (ESTRACE) 2 MG tablet Take 0.5 tablets by mouth daily

## 2024-10-24 NOTE — PATIENT INSTRUCTIONS
hands, brush your teeth twice a day, and wear a seat belt in the car.   Where can you learn more?  Go to https://www.[employer]obe[employer].net/patientEd and enter P072 to learn more about \"Well Visit, Ages 18 to 65: Care Instructions.\"  Current as of: August 6, 2023  Content Version: 14.2  © 2024 [place].   Care instructions adapted under license by [employer]. If you have questions about a medical condition or this instruction, always ask your healthcare professional. Healthwise, Incorporated disclaims any warranty or liability for your use of this information.

## 2024-10-25 LAB
CHOLESTEROL, TOTAL: NORMAL
CHOLESTEROL/HDL RATIO: NORMAL
HDLC SERPL-MCNC: NORMAL MG/DL
LDL CHOLESTEROL: NORMAL
NONHDLC SERPL-MCNC: NORMAL MG/DL
TRIGL SERPL-MCNC: NORMAL MG/DL
VLDLC SERPL CALC-MCNC: NORMAL MG/DL

## 2024-12-22 ENCOUNTER — TELEPHONE (OUTPATIENT)
Dept: INTERNAL MEDICINE CLINIC | Age: 54
End: 2024-12-22

## 2024-12-23 NOTE — TELEPHONE ENCOUNTER
Call and let her know that I was unaware that she had a blood test done this fall.  [facility] did not send me the results.  I had a reminder note to follow-up for getting your labs checked and could not find them.  I saw a little blurb about a hospital encounter at [facility] and that triggered me to open up a late call care everywhere.    Complete blood count is normal  Kidney function is very stable.  Electrolytes are acceptable.  Parathyroid hormone normal.  Vitamin D level is high end of normal.  Try to limit any vitamin D supplements to maximum of 50 mcg/day which would be the same as 2000 international units/day.  Liver tests are normal.  Cholesterol is mildly high.  Not bad enough for medication yet.  Thyroid and B12 are also normal.    In the future if you go to any lab beside a [facility] lab, please just send us a message or call to let us know that you have them done.    Xxxxxxxxxxxxxxxxxxxxxxxxxxxx    Staff please abstract with enter/edit the lipids from October 25, 2024.  [facility] care everywhere.